# Patient Record
Sex: MALE | Race: WHITE | URBAN - METROPOLITAN AREA
[De-identification: names, ages, dates, MRNs, and addresses within clinical notes are randomized per-mention and may not be internally consistent; named-entity substitution may affect disease eponyms.]

---

## 2024-04-13 ENCOUNTER — HOSPITAL ENCOUNTER (EMERGENCY)
Age: 59
Discharge: HOME | End: 2024-04-14
Attending: EMERGENCY MEDICINE
Payer: COMMERCIAL
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
## 2024-04-13 DIAGNOSIS — T18.128D FOOD IMPACTION OF ESOPHAGUS, SUBSEQUENT ENCOUNTER: Primary | ICD-10-CM

## 2024-04-13 PROCEDURE — 96374 THER/PROPH/DIAG INJ IV PUSH: CPT

## 2024-04-13 PROCEDURE — 99284 EMERGENCY DEPT VISIT MOD MDM: CPT

## 2024-04-13 RX ORDER — PANTOPRAZOLE SODIUM 40 MG/1
40 TABLET, DELAYED RELEASE ORAL DAILY
COMMUNITY

## 2024-04-13 ASSESSMENT — PAIN - FUNCTIONAL ASSESSMENT: PAIN_FUNCTIONAL_ASSESSMENT: 0-10

## 2024-04-13 ASSESSMENT — PAIN SCALES - GENERAL: PAINLEVEL_OUTOF10: 0

## 2024-04-14 VITALS
TEMPERATURE: 98.1 F | HEART RATE: 56 BPM | DIASTOLIC BLOOD PRESSURE: 70 MMHG | SYSTOLIC BLOOD PRESSURE: 131 MMHG | BODY MASS INDEX: 24.25 KG/M2 | HEIGHT: 68 IN | WEIGHT: 160 LBS | RESPIRATION RATE: 13 BRPM | OXYGEN SATURATION: 98 %

## 2024-04-14 LAB
ANION GAP SERPL CALCULATED.3IONS-SCNC: 12 MMOL/L (ref 3–16)
BASOPHILS # BLD: 0.1 K/UL (ref 0–0.2)
BASOPHILS NFR BLD: 1.2 %
BUN SERPL-MCNC: 6 MG/DL (ref 7–20)
CALCIUM SERPL-MCNC: 8.7 MG/DL (ref 8.3–10.6)
CHLORIDE SERPL-SCNC: 101 MMOL/L (ref 99–110)
CO2 SERPL-SCNC: 28 MMOL/L (ref 21–32)
CREAT SERPL-MCNC: 0.8 MG/DL (ref 0.9–1.3)
DEPRECATED RDW RBC AUTO: 14.6 % (ref 12.4–15.4)
EOSINOPHIL # BLD: 0.4 K/UL (ref 0–0.6)
EOSINOPHIL NFR BLD: 4.8 %
GFR SERPLBLD CREATININE-BSD FMLA CKD-EPI: >90 ML/MIN/{1.73_M2}
GLUCOSE SERPL-MCNC: 108 MG/DL (ref 70–99)
HCT VFR BLD AUTO: 44.9 % (ref 40.5–52.5)
HGB BLD-MCNC: 15.4 G/DL (ref 13.5–17.5)
LYMPHOCYTES # BLD: 2.2 K/UL (ref 1–5.1)
LYMPHOCYTES NFR BLD: 25.2 %
MAGNESIUM SERPL-MCNC: 2.2 MG/DL (ref 1.8–2.4)
MCH RBC QN AUTO: 31.9 PG (ref 26–34)
MCHC RBC AUTO-ENTMCNC: 34.2 G/DL (ref 31–36)
MCV RBC AUTO: 93.2 FL (ref 80–100)
MONOCYTES # BLD: 0.8 K/UL (ref 0–1.3)
MONOCYTES NFR BLD: 9.3 %
NEUTROPHILS # BLD: 5.2 K/UL (ref 1.7–7.7)
NEUTROPHILS NFR BLD: 59.5 %
PLATELET # BLD AUTO: 234 K/UL (ref 135–450)
PMV BLD AUTO: 8.8 FL (ref 5–10.5)
POTASSIUM SERPL-SCNC: 2.7 MMOL/L (ref 3.5–5.1)
RBC # BLD AUTO: 4.82 M/UL (ref 4.2–5.9)
SODIUM SERPL-SCNC: 141 MMOL/L (ref 136–145)
WBC # BLD AUTO: 8.7 K/UL (ref 4–11)

## 2024-04-14 PROCEDURE — 85025 COMPLETE CBC W/AUTO DIFF WBC: CPT

## 2024-04-14 PROCEDURE — 99152 MOD SED SAME PHYS/QHP 5/>YRS: CPT | Performed by: INTERNAL MEDICINE

## 2024-04-14 PROCEDURE — 6360000002 HC RX W HCPCS: Performed by: EMERGENCY MEDICINE

## 2024-04-14 PROCEDURE — 96374 THER/PROPH/DIAG INJ IV PUSH: CPT

## 2024-04-14 PROCEDURE — 3609012900 HC EGD FOREIGN BODY REMOVAL: Performed by: INTERNAL MEDICINE

## 2024-04-14 PROCEDURE — 83735 ASSAY OF MAGNESIUM: CPT

## 2024-04-14 PROCEDURE — 2709999900 HC NON-CHARGEABLE SUPPLY: Performed by: INTERNAL MEDICINE

## 2024-04-14 PROCEDURE — 80048 BASIC METABOLIC PNL TOTAL CA: CPT

## 2024-04-14 PROCEDURE — 6360000002 HC RX W HCPCS: Performed by: INTERNAL MEDICINE

## 2024-04-14 RX ORDER — GLUCAGON 1 MG/ML
KIT INJECTION
Status: DISCONTINUED
Start: 2024-04-14 | End: 2024-04-14 | Stop reason: HOSPADM

## 2024-04-14 RX ORDER — GLUCAGON 1 MG/ML
1 KIT INJECTION ONCE
Status: COMPLETED | OUTPATIENT
Start: 2024-04-14 | End: 2024-04-14

## 2024-04-14 RX ORDER — MIDAZOLAM HYDROCHLORIDE 5 MG/ML
INJECTION INTRAMUSCULAR; INTRAVENOUS PRN
Status: DISCONTINUED | OUTPATIENT
Start: 2024-04-14 | End: 2024-04-14 | Stop reason: ALTCHOICE

## 2024-04-14 RX ORDER — FENTANYL CITRATE 50 UG/ML
INJECTION, SOLUTION INTRAMUSCULAR; INTRAVENOUS PRN
Status: DISCONTINUED | OUTPATIENT
Start: 2024-04-14 | End: 2024-04-14 | Stop reason: ALTCHOICE

## 2024-04-14 RX ORDER — POTASSIUM CHLORIDE 7.45 MG/ML
10 INJECTION INTRAVENOUS
Status: COMPLETED | OUTPATIENT
Start: 2024-04-14 | End: 2024-04-14

## 2024-04-14 RX ADMIN — GLUCAGON 1 MG: 1 INJECTION, POWDER, LYOPHILIZED, FOR SOLUTION INTRAMUSCULAR; INTRAVENOUS at 00:17

## 2024-04-14 RX ADMIN — POTASSIUM CHLORIDE 10 MEQ: 7.46 INJECTION, SOLUTION INTRAVENOUS at 01:55

## 2024-04-14 RX ADMIN — POTASSIUM CHLORIDE 10 MEQ: 7.46 INJECTION, SOLUTION INTRAVENOUS at 03:00

## 2024-04-14 ASSESSMENT — PAIN SCALES - GENERAL: PAINLEVEL_OUTOF10: 0

## 2024-04-14 NOTE — ED PROVIDER NOTES
EMERGENCY DEPARTMENT ENCOUNTER     [facility]  ED     Pt Name: Dejuan Bhatti   MRN: [id number]   Birthdate 1965   Date of evaluation: 4/13/2024   Provider: Tiffanie Hobson MD   PCP: Garth Silveira MD   Note Started: 11:58 PM EDT 4/13/24     CHIEF COMPLAINT     Chief Complaint   Patient presents with    Other     Pt states around 2145 he was eating ribs and thinks there is some stuck in his throat, reports difficulty swallowing since. Pt has had this happen multiple times and states they usually have to go in and get it removed. Has had his esophagus stretched multiple times         HISTORY OF PRESENT ILLNESS:          Dejuan Bhatti is a 58 y.o. male who presents with a chief complaint of food stuck in throat. Has a h/o esophageal stretching since he was 16. Since 9:45pm while he was having dinner he felt it stuck. He is unable is unable to keep anything down. He has a bottle for spit. No pain.      Nursing Notes were all reviewed and agreed with or any disagreements were addressed in the HPI.     ROS: Positives and Pertinent negatives as per HPI.    PAST MEDICAL HISTORY     Past medical history:  has no past medical history on file.    Past surgical history:  has a past surgical history that includes Esophagus dilation and Upper gastrointestinal endoscopy (5/30/2012).      PHYSICAL EXAM:  ED Triage Vitals   BP Temp Temp Source Pulse Respirations SpO2 Height Weight - Scale   04/13/24 2351 04/13/24 2349 04/13/24 2349 04/13/24 2346 04/13/24 2346 04/13/24 2346 04/13/24 2346 04/13/24 2346   (!) 173/102 98.1 °F (36.7 °C) Oral 74 17 96 % 1.727 m (5' 8\") 72.6 kg (160 lb)        Physical Exam  Vitals and nursing note reviewed.   Constitutional:       Appearance: Normal appearance. He is well-developed. He is not ill-appearing.   HENT:      Head: Normocephalic and atraumatic.      Right Ear: External ear normal.      Left Ear: External ear normal.      Nose: Nose normal.   Eyes:      General:

## 2024-04-14 NOTE — PROCEDURES
[address] [address]                             PROCEDURE NOTE      PATIENT NAME: ANJU COX              : 1965  MED REC NO: [id number]                      ROOM: 03  ACCOUNT NO: [id number]                       ADMIT DATE: 2024  PROVIDER: Ezequiel Ferrara MD      DATE OF PROCEDURE:      SURGEON:  Ezequiel Ferrara MD    PREPROCEDURE DIAGNOSES:    1. Food impaction.  2. Dysphagia.    PROCEDURE:   Esophagogastroduodenoscopy with foreign body retrieval.    POSTPROCEDURE DIAGNOSES:    1. Food impaction.  2. Foreign body retrieval.  3. Suspected eosinophilic esophagitis.    INDICATION FOR PROCEDURE:  This 58-year-old male with history of GERD and dysphagia presented to the emergency room with foreign body impaction after eating roast beef.  He is not able to tolerate his own saliva or secretions.  EGD is being performed for therapeutic purposes.    MEDICATIONS:  Versed 7 mg.  Fentanyl 100 mcg.    PROCEDURE DETAILS:  Informed consent obtained after discussing risks, benefits, and alternatives.  Full history and physical were performed.  Patient was classified as ASA class 3.  Medications were given subsequently to achieve the adequate sedation.  Cardiopulmonary status was continuously monitored throughout the procedure.  The patient was placed in left lateral decubitus position.  Once adequately positioned, a standard upper gastroscope was inserted in the mouth and advanced under direct visualization to the 2nd portion of the duodenum.  Entire mucosa of the esophagus, stomach (retroflexion and forward views), duodenum (bulb, sweep, and second portion) were examined carefully during withdrawal.  The patient tolerated the procedure well without any difficulties.    FINDINGS:  Esophagus:  There was a large flood bolus impacted in the upper third of the esophagus.  This was successfully retrieved using

## 2024-04-14 NOTE — CONSULTS
Called [facility] @ 0013  PER:  Tiffanie Hobson MD  RE:  esophageal impaction  Bibi Aleman MD responded to page @ [phone]  Called [facility] @ 0051  Ezequiel Ferrara MD responded to page @ [phone]

## 2024-04-14 NOTE — ED NOTES
Ok for d/c. Edu pt to f/u w/ PCP and GI. Pt verbalized understanding. Pt left, ambulatory, w/ all personal belongings. Wife to drive.

## 2024-04-14 NOTE — H&P
History and Physical / Pre-Sedation Assessment    Patient:  Dejuan Bhatti   :   1965     Intended Procedure:  EGD    HPI: 58 year old male with food impaction    No past medical history on file.  Past Surgical History:   Procedure Laterality Date    ESOPHAGEAL DILATATION      UPPER GASTROINTESTINAL ENDOSCOPY  2012    gastritis, duodenitis, dilated       Medications reviewed  Prior to Admission medications    Medication Sig Start Date End Date Taking? Authorizing Provider   pantoprazole (PROTONIX) 40 MG tablet Take 1 tablet by mouth daily   Yes Jose Lujan MD   methylphenidate (CONCERTA) 54 MG CR tablet Take 54 mg by mouth every morning.    Patient not taking: Reported on 2024    Jose Lujan MD   omeprazole (PRILOSEC) 20 MG capsule Take 1 capsule by mouth daily. 12  Christian Lopes MD        Allergies: No Known Allergies    Nurses notes reviewed and agreed.    Physical Exam:  Vital Signs: BP (!) 179/99   Pulse 57   Temp 98.1 °F (36.7 °C) (Oral)   Resp 16   Ht 1.727 m (5' 8\")   Wt 72.6 kg (160 lb)   SpO2 98%   BMI 24.33 kg/m²    Airway: Mallampati: II (soft palate, uvula, fauces visible)  Pulmonary:Normal  Cardiac:Normal  Abdomen:Normal    Pre-Procedure Assessment / Plan:  ASA: Class 3 - A patient with severe systemic disease that limits activity but is not incapacitating  Level of Sedation Plan:Moderate sedation  Post Procedure plan: Return to same level of care    I assessed the patient and find that the patient is in satisfactory condition to proceed with the planned procedure and sedation plan.    I have explained the risk, benefits, and alternatives to the procedure; the patient understands and agrees to proceed.       Ezequiel Ferrara MD  2024

## 2024-10-11 ENCOUNTER — HOSPITAL ENCOUNTER (EMERGENCY)
Age: 59
Discharge: LEFT BEFORE DISCHARGE | End: 2024-10-11

## 2024-10-11 VITALS
SYSTOLIC BLOOD PRESSURE: 143 MMHG | WEIGHT: 170 LBS | OXYGEN SATURATION: 96 % | RESPIRATION RATE: 18 BRPM | HEIGHT: 68 IN | HEART RATE: 60 BPM | TEMPERATURE: 97.9 F | BODY MASS INDEX: 25.76 KG/M2 | DIASTOLIC BLOOD PRESSURE: 91 MMHG

## 2024-10-11 RX ORDER — LOSARTAN POTASSIUM 100 MG/1
100 TABLET ORAL DAILY
COMMUNITY

## 2024-10-11 ASSESSMENT — PAIN - FUNCTIONAL ASSESSMENT: PAIN_FUNCTIONAL_ASSESSMENT: NONE - DENIES PAIN

## 2024-10-12 NOTE — ED NOTES
Pt given starry and instructed to jump up and down. States david became un lodged and he was able to swallow. States he is good to leave now.

## 2024-10-29 ENCOUNTER — HOSPITAL ENCOUNTER (EMERGENCY)
Age: 59
Discharge: HOME | End: 2024-10-30
Attending: EMERGENCY MEDICINE
Payer: COMMERCIAL

## 2024-10-29 DIAGNOSIS — T18.108A FOREIGN BODY IN ESOPHAGUS, INITIAL ENCOUNTER: Primary | ICD-10-CM

## 2024-10-29 PROCEDURE — 96372 THER/PROPH/DIAG INJ SC/IM: CPT

## 2024-10-29 PROCEDURE — 6370000000 HC RX 637 (ALT 250 FOR IP)

## 2024-10-29 PROCEDURE — 6360000002 HC RX W HCPCS

## 2024-10-29 PROCEDURE — 99152 MOD SED SAME PHYS/QHP 5/>YRS: CPT | Performed by: INTERNAL MEDICINE

## 2024-10-29 PROCEDURE — 99284 EMERGENCY DEPT VISIT MOD MDM: CPT

## 2024-10-29 PROCEDURE — 3609012900 HC EGD FOREIGN BODY REMOVAL: Performed by: INTERNAL MEDICINE

## 2024-10-29 PROCEDURE — 96375 TX/PRO/DX INJ NEW DRUG ADDON: CPT

## 2024-10-29 PROCEDURE — 96374 THER/PROPH/DIAG INJ IV PUSH: CPT

## 2024-10-29 PROCEDURE — 6360000002 HC RX W HCPCS: Performed by: INTERNAL MEDICINE

## 2024-10-29 PROCEDURE — 2709999900 HC NON-CHARGEABLE SUPPLY: Performed by: INTERNAL MEDICINE

## 2024-10-29 RX ORDER — NITROGLYCERIN 0.4 MG/1
0.4 TABLET SUBLINGUAL ONCE
Status: COMPLETED | OUTPATIENT
Start: 2024-10-29 | End: 2024-10-29

## 2024-10-29 RX ORDER — FENTANYL CITRATE 50 UG/ML
INJECTION, SOLUTION INTRAMUSCULAR; INTRAVENOUS PRN
Status: DISCONTINUED | OUTPATIENT
Start: 2024-10-29 | End: 2024-10-29 | Stop reason: ALTCHOICE

## 2024-10-29 RX ORDER — MIDAZOLAM HYDROCHLORIDE 5 MG/ML
INJECTION, SOLUTION INTRAMUSCULAR; INTRAVENOUS PRN
Status: DISCONTINUED | OUTPATIENT
Start: 2024-10-29 | End: 2024-10-29 | Stop reason: ALTCHOICE

## 2024-10-29 RX ORDER — GLUCAGON 1 MG/ML
1 KIT INJECTION ONCE
Status: COMPLETED | OUTPATIENT
Start: 2024-10-29 | End: 2024-10-29

## 2024-10-29 RX ADMIN — NITROGLYCERIN 0.4 MG: 0.4 TABLET SUBLINGUAL at 19:53

## 2024-10-29 RX ADMIN — GLUCAGON 1 MG: 1 INJECTION, POWDER, LYOPHILIZED, FOR SOLUTION INTRAMUSCULAR; INTRAVENOUS at 19:53

## 2024-10-29 ASSESSMENT — LIFESTYLE VARIABLES
HOW MANY STANDARD DRINKS CONTAINING ALCOHOL DO YOU HAVE ON A TYPICAL DAY: 1 OR 2
HOW OFTEN DO YOU HAVE A DRINK CONTAINING ALCOHOL: MONTHLY OR LESS

## 2024-10-29 ASSESSMENT — PAIN - FUNCTIONAL ASSESSMENT: PAIN_FUNCTIONAL_ASSESSMENT: 0-10

## 2024-10-29 ASSESSMENT — PAIN SCALES - GENERAL: PAINLEVEL_OUTOF10: 0

## 2024-10-30 VITALS
HEIGHT: 68 IN | TEMPERATURE: 97.8 F | OXYGEN SATURATION: 99 % | RESPIRATION RATE: 14 BRPM | BODY MASS INDEX: 26.1 KG/M2 | WEIGHT: 172.2 LBS | DIASTOLIC BLOOD PRESSURE: 97 MMHG | HEART RATE: 61 BPM | SYSTOLIC BLOOD PRESSURE: 137 MMHG

## 2024-10-30 NOTE — DISCHARGE INSTRUCTIONS
Please follow-up with your gastroenterologist for further evaluation and continued management.  Strict return precautions for new or worsening symptoms discussed.

## 2024-10-30 NOTE — ED NOTES
2100- called GI for consult   RE: esophageal foreign body  Per: RAGHAV Veras  2108- Doctor Alem returned page with RAGHAV Veras

## 2024-10-30 NOTE — H&P
History and Physical / Pre-Sedation Assessment    Patient:  Dejuan Bhatti   :   1965     Intended Procedure:  EGD    HPI: 60 yo male with EoE presents for food impaction    Past Medical History:   Diagnosis Date    Hypertension      Past Surgical History:   Procedure Laterality Date    ESOPHAGEAL DILATATION      UPPER GASTROINTESTINAL ENDOSCOPY  2012    gastritis, duodenitis, dilated    UPPER GASTROINTESTINAL ENDOSCOPY N/A 2024    ESOPHAGOGASTRODUODENOSCOPY FOREIGN BODY REMOVAL performed by Ezequiel Ferrara MD at [facility] ENDOSCOPY       Medications reviewed  Prior to Admission medications    Medication Sig Start Date End Date Taking? Authorizing Provider   losartan (COZAAR) 100 MG tablet Take 1 tablet by mouth daily    Jose Lujan MD   pantoprazole (PROTONIX) 40 MG tablet Take 1 tablet by mouth daily    Jose Lujan MD   methylphenidate (CONCERTA) 54 MG CR tablet Take 54 mg by mouth every morning.    Patient not taking: Reported on 2024    Jose Lujan MD        Allergies: No Known Allergies    Nurses notes reviewed and agreed.    Physical Exam:  Vital Signs: /84   Pulse 86   Temp 97.8 °F (36.6 °C) (Oral)   Resp 17   Ht 1.727 m (5' 8\")   Wt 78.1 kg (172 lb 3.2 oz)   SpO2 97%   BMI 26.18 kg/m²    Airway: Mallampati: II (soft palate, uvula, fauces visible)  Pulmonary:Normal  Cardiac:Normal  Abdomen:Normal    Pre-Procedure Assessment / Plan:  ASA: Class 3 - A patient with severe systemic disease that limits activity but is not incapacitating  Level of Sedation Plan:Moderate sedation  Post Procedure plan: Return to same level of care    I assessed the patient and find that the patient is in satisfactory condition to proceed with the planned procedure and sedation plan.    I have explained the risk, benefits, and alternatives to the procedure; the patient understands and agrees to proceed.       Ezequiel Ferrara MD  10/29/2024

## 2024-10-30 NOTE — ED PROVIDER NOTES
[facility]  ED  EMERGENCY DEPARTMENT ENCOUNTER        Pt Name: Dejuan Bhatti  MRN: [id number]  Birthdate 1965  Date of evaluation: 10/29/2024  Provider: RAGHAV Beyer Jr  PCP: Garth Silveira MD  Note Started: 11:22 PM EDT 10/29/24      HONORIO. I have evaluated this patient.        CHIEF COMPLAINT       Chief Complaint   Patient presents with    Foreign Body     Patient reports after 1 bite of cheese coney at 1430 and feels it is stuck in his throat, hx of esophageal dilation in April and similar issue 1 month ago. No relief with pop or jumping up and down. Denies SOB       HISTORY OF PRESENT ILLNESS: 1 or more Elements     History from : Patient    Limitations to history : None    Dejuan Bhatti is a 59 y.o. male who presents with complaints that he believes he has a food impaction in his esophagus.  States that this has been an ongoing issue for him for years and has had to have his stricture stretched multiple times.  States that he was recently stretched by Dr. Lopes in April of this year.  He did attempt to drink soda and jump up and down because this has worked in the past however, he regurgitated the soda and any other fluids that he tried drinking.  He is not having any shortness of breath.  No other complaints this time.    Nursing Notes were all reviewed and agreed with or any disagreements were addressed in the HPI.      SURGICAL HISTORY     Past Surgical History:   Procedure Laterality Date    ESOPHAGEAL DILATATION      UPPER GASTROINTESTINAL ENDOSCOPY  5/30/2012    gastritis, duodenitis, dilated    UPPER GASTROINTESTINAL ENDOSCOPY N/A 4/14/2024    ESOPHAGOGASTRODUODENOSCOPY FOREIGN BODY REMOVAL performed by Ezequiel Ferrara MD at [facility] ENDOSCOPY       CURRENTMEDICATIONS       Previous Medications    LOSARTAN (COZAAR) 100 MG TABLET    Take 1 tablet by mouth daily    METHYLPHENIDATE (CONCERTA) 54 MG CR TABLET    Take 54 mg by mouth every morning.

## (undated) DEVICE — ENDO CARRY-ON PROCEDURE KIT INCLUDES SUCTION TUBING, LUBRICANT, GAUZE, BIOHAZARD STICKER, TRANSPORT PAD AND INTERCEPT BEDSIDE KIT.: Brand: ENDO CARRY-ON PROCEDURE KIT

## (undated) DEVICE — CONMED SCOPE SAVER BITE BLOCK, 20X27 MM: Brand: SCOPE SAVER

## (undated) DEVICE — TUBING, SUCTION, 1/4" X 10', STRAIGHT: Brand: MEDLINE

## (undated) DEVICE — RETRIEVAL DEVICE: Brand: RESCUENET™

## (undated) DEVICE — ELECTRODE ECG MONITR FOAM TEAR DROP ADLT RED